# Patient Record
Sex: MALE | Race: WHITE | NOT HISPANIC OR LATINO | URBAN - METROPOLITAN AREA
[De-identification: names, ages, dates, MRNs, and addresses within clinical notes are randomized per-mention and may not be internally consistent; named-entity substitution may affect disease eponyms.]

---

## 2023-04-17 ENCOUNTER — INPATIENT (INPATIENT)
Facility: HOSPITAL | Age: 53
LOS: 1 days | Discharge: HOME | DRG: 354 | End: 2023-04-19
Attending: SPECIALIST | Admitting: SPECIALIST
Payer: MEDICARE

## 2023-04-17 VITALS
DIASTOLIC BLOOD PRESSURE: 85 MMHG | SYSTOLIC BLOOD PRESSURE: 125 MMHG | RESPIRATION RATE: 20 BRPM | HEART RATE: 101 BPM | TEMPERATURE: 99 F | OXYGEN SATURATION: 93 % | HEIGHT: 75 IN | WEIGHT: 315 LBS

## 2023-04-17 DIAGNOSIS — K56.609 UNSPECIFIED INTESTINAL OBSTRUCTION, UNSPECIFIED AS TO PARTIAL VERSUS COMPLETE OBSTRUCTION: ICD-10-CM

## 2023-04-17 LAB
ALBUMIN SERPL ELPH-MCNC: 4.8 G/DL — SIGNIFICANT CHANGE UP (ref 3.3–5.2)
ALP SERPL-CCNC: 54 U/L — SIGNIFICANT CHANGE UP (ref 40–120)
ALT FLD-CCNC: 25 U/L — SIGNIFICANT CHANGE UP
ANION GAP SERPL CALC-SCNC: 19 MMOL/L — HIGH (ref 5–17)
APTT BLD: 26.3 SEC — LOW (ref 27.5–35.5)
AST SERPL-CCNC: 30 U/L — SIGNIFICANT CHANGE UP
BASE EXCESS BLDV CALC-SCNC: 9.8 MMOL/L — HIGH (ref -2–3)
BASOPHILS # BLD AUTO: 0 K/UL — SIGNIFICANT CHANGE UP (ref 0–0.2)
BASOPHILS NFR BLD AUTO: 0 % — SIGNIFICANT CHANGE UP (ref 0–2)
BILIRUB SERPL-MCNC: 0.5 MG/DL — SIGNIFICANT CHANGE UP (ref 0.4–2)
BLD GP AB SCN SERPL QL: SIGNIFICANT CHANGE UP
BUN SERPL-MCNC: 48.4 MG/DL — HIGH (ref 8–20)
CA-I SERPL-SCNC: 1.09 MMOL/L — LOW (ref 1.15–1.33)
CALCIUM SERPL-MCNC: 10 MG/DL — SIGNIFICANT CHANGE UP (ref 8.4–10.5)
CHLORIDE BLDV-SCNC: 93 MMOL/L — LOW (ref 96–108)
CHLORIDE SERPL-SCNC: 89 MMOL/L — LOW (ref 96–108)
CO2 SERPL-SCNC: 25 MMOL/L — SIGNIFICANT CHANGE UP (ref 22–29)
CREAT SERPL-MCNC: 1.62 MG/DL — HIGH (ref 0.5–1.3)
EGFR: 50 ML/MIN/1.73M2 — LOW
EOSINOPHIL # BLD AUTO: 0.34 K/UL — SIGNIFICANT CHANGE UP (ref 0–0.5)
EOSINOPHIL NFR BLD AUTO: 5.2 % — SIGNIFICANT CHANGE UP (ref 0–6)
FLUAV AG NPH QL: SIGNIFICANT CHANGE UP
FLUBV AG NPH QL: SIGNIFICANT CHANGE UP
GAS PNL BLDV: 132 MMOL/L — LOW (ref 136–145)
GAS PNL BLDV: SIGNIFICANT CHANGE UP
GIANT PLATELETS BLD QL SMEAR: PRESENT — SIGNIFICANT CHANGE UP
GLUCOSE BLDV-MCNC: 159 MG/DL — HIGH (ref 70–99)
GLUCOSE SERPL-MCNC: 147 MG/DL — HIGH (ref 70–99)
HCO3 BLDV-SCNC: 33 MMOL/L — HIGH (ref 22–29)
HCT VFR BLD CALC: 41.8 % — SIGNIFICANT CHANGE UP (ref 39–50)
HCT VFR BLDA CALC: 44 % — SIGNIFICANT CHANGE UP
HGB BLD CALC-MCNC: 14.6 G/DL — SIGNIFICANT CHANGE UP (ref 12.6–17.4)
HGB BLD-MCNC: 13.9 G/DL — SIGNIFICANT CHANGE UP (ref 13–17)
INR BLD: 1.2 RATIO — HIGH (ref 0.88–1.16)
LACTATE BLDV-MCNC: 1.7 MMOL/L — SIGNIFICANT CHANGE UP (ref 0.5–2)
LIDOCAIN IGE QN: 127 U/L — HIGH (ref 22–51)
LYMPHOCYTES # BLD AUTO: 1.67 K/UL — SIGNIFICANT CHANGE UP (ref 1–3.3)
LYMPHOCYTES # BLD AUTO: 25.2 % — SIGNIFICANT CHANGE UP (ref 13–44)
MANUAL SMEAR VERIFICATION: SIGNIFICANT CHANGE UP
MCHC RBC-ENTMCNC: 27.6 PG — SIGNIFICANT CHANGE UP (ref 27–34)
MCHC RBC-ENTMCNC: 33.3 GM/DL — SIGNIFICANT CHANGE UP (ref 32–36)
MCV RBC AUTO: 83.1 FL — SIGNIFICANT CHANGE UP (ref 80–100)
MONOCYTES # BLD AUTO: 1.09 K/UL — HIGH (ref 0–0.9)
MONOCYTES NFR BLD AUTO: 16.5 % — HIGH (ref 2–14)
NEUTROPHILS # BLD AUTO: 3.35 K/UL — SIGNIFICANT CHANGE UP (ref 1.8–7.4)
NEUTROPHILS NFR BLD AUTO: 50.5 % — SIGNIFICANT CHANGE UP (ref 43–77)
PCO2 BLDV: 40 MMHG — LOW (ref 42–55)
PH BLDV: 7.52 — HIGH (ref 7.32–7.43)
PLAT MORPH BLD: NORMAL — SIGNIFICANT CHANGE UP
PLATELET # BLD AUTO: 439 K/UL — HIGH (ref 150–400)
PO2 BLDV: 177 MMHG — HIGH (ref 25–45)
POTASSIUM BLDV-SCNC: 3.9 MMOL/L — SIGNIFICANT CHANGE UP (ref 3.5–5.1)
POTASSIUM SERPL-MCNC: 3.9 MMOL/L — SIGNIFICANT CHANGE UP (ref 3.5–5.3)
POTASSIUM SERPL-SCNC: 3.9 MMOL/L — SIGNIFICANT CHANGE UP (ref 3.5–5.3)
PROT SERPL-MCNC: 9.1 G/DL — HIGH (ref 6.6–8.7)
PROTHROM AB SERPL-ACNC: 13.9 SEC — HIGH (ref 10.5–13.4)
RBC # BLD: 5.03 M/UL — SIGNIFICANT CHANGE UP (ref 4.2–5.8)
RBC # FLD: 14.6 % — HIGH (ref 10.3–14.5)
RBC BLD AUTO: NORMAL — SIGNIFICANT CHANGE UP
RSV RNA NPH QL NAA+NON-PROBE: SIGNIFICANT CHANGE UP
SAO2 % BLDV: 99.8 % — SIGNIFICANT CHANGE UP
SARS-COV-2 RNA SPEC QL NAA+PROBE: SIGNIFICANT CHANGE UP
SODIUM SERPL-SCNC: 133 MMOL/L — LOW (ref 135–145)
VARIANT LYMPHS # BLD: 2.6 % — SIGNIFICANT CHANGE UP (ref 0–6)
WBC # BLD: 6.63 K/UL — SIGNIFICANT CHANGE UP (ref 3.8–10.5)
WBC # FLD AUTO: 6.63 K/UL — SIGNIFICANT CHANGE UP (ref 3.8–10.5)

## 2023-04-17 PROCEDURE — 74176 CT ABD & PELVIS W/O CONTRAST: CPT | Mod: 26,59

## 2023-04-17 PROCEDURE — 99285 EMERGENCY DEPT VISIT HI MDM: CPT | Mod: FS

## 2023-04-17 PROCEDURE — 74177 CT ABD & PELVIS W/CONTRAST: CPT | Mod: 26,MA

## 2023-04-17 PROCEDURE — 99221 1ST HOSP IP/OBS SF/LOW 40: CPT | Mod: GC

## 2023-04-17 RX ORDER — ONDANSETRON 8 MG/1
4 TABLET, FILM COATED ORAL EVERY 6 HOURS
Refills: 0 | Status: DISCONTINUED | OUTPATIENT
Start: 2023-04-17 | End: 2023-04-18

## 2023-04-17 RX ORDER — MORPHINE SULFATE 50 MG/1
2 CAPSULE, EXTENDED RELEASE ORAL ONCE
Refills: 0 | Status: DISCONTINUED | OUTPATIENT
Start: 2023-04-17 | End: 2023-04-17

## 2023-04-17 RX ORDER — MORPHINE SULFATE 50 MG/1
2 CAPSULE, EXTENDED RELEASE ORAL EVERY 4 HOURS
Refills: 0 | Status: DISCONTINUED | OUTPATIENT
Start: 2023-04-17 | End: 2023-04-19

## 2023-04-17 RX ORDER — SODIUM CHLORIDE 9 MG/ML
1000 INJECTION, SOLUTION INTRAVENOUS
Refills: 0 | Status: DISCONTINUED | OUTPATIENT
Start: 2023-04-17 | End: 2023-04-18

## 2023-04-17 RX ORDER — MORPHINE SULFATE 50 MG/1
4 CAPSULE, EXTENDED RELEASE ORAL ONCE
Refills: 0 | Status: DISCONTINUED | OUTPATIENT
Start: 2023-04-17 | End: 2023-04-17

## 2023-04-17 RX ORDER — KETOROLAC TROMETHAMINE 30 MG/ML
15 SYRINGE (ML) INJECTION EVERY 6 HOURS
Refills: 0 | Status: DISCONTINUED | OUTPATIENT
Start: 2023-04-17 | End: 2023-04-18

## 2023-04-17 RX ORDER — ACETAMINOPHEN 500 MG
1000 TABLET ORAL EVERY 6 HOURS
Refills: 0 | Status: COMPLETED | OUTPATIENT
Start: 2023-04-17 | End: 2023-04-18

## 2023-04-17 RX ORDER — ONDANSETRON 8 MG/1
4 TABLET, FILM COATED ORAL ONCE
Refills: 0 | Status: COMPLETED | OUTPATIENT
Start: 2023-04-17 | End: 2023-04-17

## 2023-04-17 RX ORDER — SODIUM CHLORIDE 9 MG/ML
1000 INJECTION INTRAMUSCULAR; INTRAVENOUS; SUBCUTANEOUS ONCE
Refills: 0 | Status: COMPLETED | OUTPATIENT
Start: 2023-04-17 | End: 2023-04-17

## 2023-04-17 RX ORDER — HEPARIN SODIUM 5000 [USP'U]/ML
5000 INJECTION INTRAVENOUS; SUBCUTANEOUS EVERY 8 HOURS
Refills: 0 | Status: DISCONTINUED | OUTPATIENT
Start: 2023-04-17 | End: 2023-04-18

## 2023-04-17 RX ORDER — HEPARIN SODIUM 5000 [USP'U]/ML
7500 INJECTION INTRAVENOUS; SUBCUTANEOUS EVERY 8 HOURS
Refills: 0 | Status: DISCONTINUED | OUTPATIENT
Start: 2023-04-17 | End: 2023-04-17

## 2023-04-17 RX ORDER — METOCLOPRAMIDE HCL 10 MG
10 TABLET ORAL ONCE
Refills: 0 | Status: COMPLETED | OUTPATIENT
Start: 2023-04-17 | End: 2023-04-17

## 2023-04-17 RX ORDER — METOCLOPRAMIDE HCL 10 MG
5 TABLET ORAL ONCE
Refills: 0 | Status: COMPLETED | OUTPATIENT
Start: 2023-04-17 | End: 2023-04-18

## 2023-04-17 RX ADMIN — SODIUM CHLORIDE 1000 MILLILITER(S): 9 INJECTION INTRAMUSCULAR; INTRAVENOUS; SUBCUTANEOUS at 10:10

## 2023-04-17 RX ADMIN — MORPHINE SULFATE 4 MILLIGRAM(S): 50 CAPSULE, EXTENDED RELEASE ORAL at 12:57

## 2023-04-17 RX ADMIN — MORPHINE SULFATE 4 MILLIGRAM(S): 50 CAPSULE, EXTENDED RELEASE ORAL at 14:01

## 2023-04-17 RX ADMIN — Medication 10 MILLIGRAM(S): at 20:08

## 2023-04-17 RX ADMIN — MORPHINE SULFATE 2 MILLIGRAM(S): 50 CAPSULE, EXTENDED RELEASE ORAL at 16:44

## 2023-04-17 RX ADMIN — HEPARIN SODIUM 7500 UNIT(S): 5000 INJECTION INTRAVENOUS; SUBCUTANEOUS at 20:53

## 2023-04-17 RX ADMIN — ONDANSETRON 4 MILLIGRAM(S): 8 TABLET, FILM COATED ORAL at 12:57

## 2023-04-17 RX ADMIN — SODIUM CHLORIDE 1000 MILLILITER(S): 9 INJECTION INTRAMUSCULAR; INTRAVENOUS; SUBCUTANEOUS at 14:01

## 2023-04-17 RX ADMIN — Medication 400 MILLIGRAM(S): at 18:03

## 2023-04-17 RX ADMIN — Medication 400 MILLIGRAM(S): at 23:46

## 2023-04-17 RX ADMIN — MORPHINE SULFATE 4 MILLIGRAM(S): 50 CAPSULE, EXTENDED RELEASE ORAL at 10:09

## 2023-04-17 RX ADMIN — ONDANSETRON 4 MILLIGRAM(S): 8 TABLET, FILM COATED ORAL at 10:10

## 2023-04-17 RX ADMIN — MORPHINE SULFATE 2 MILLIGRAM(S): 50 CAPSULE, EXTENDED RELEASE ORAL at 18:14

## 2023-04-17 RX ADMIN — MORPHINE SULFATE 2 MILLIGRAM(S): 50 CAPSULE, EXTENDED RELEASE ORAL at 20:55

## 2023-04-17 RX ADMIN — ONDANSETRON 4 MILLIGRAM(S): 8 TABLET, FILM COATED ORAL at 16:48

## 2023-04-17 RX ADMIN — SODIUM CHLORIDE 100 MILLILITER(S): 9 INJECTION, SOLUTION INTRAVENOUS at 14:03

## 2023-04-17 NOTE — ED STATDOCS - OBJECTIVE STATEMENT
51 y/o male with no significant PMHx presents to ED c/o abdominal pain since Friday with associated n/v, hematuria and diaphoresis. Pt vomited black/dark brown color on Saturday. Then, pt reports watery stool. Pt lost about 30 pounds over the weekend. Pain came back last night, to the left lower quadrant and pain to umbilicus. Diarrhea has subsided, but pt still feels off. No allergies to medications or daily medication use. Denies f/cp/sob/palpitations/ cough/rash/headache/dizziness/abd.pain/d/c/dysuria 51 y/o male with no significant PMHx presents to ED c/o abdominal pain since Friday with associated n/v/d. Pt vomited black/dark brown color on Saturday. Then, pt reports watery stool. Pt lost about 30 pounds over the weekend?. Pain came back last night, to the left lower quadrant and pain to umbilicus. Diarrhea has subsided, but pt still feels off. No allergies to medications or daily medication use. Denies f/cp/sob/palpitations/ cough/rash/headache/dizziness/c/dysuria

## 2023-04-17 NOTE — ED STATDOCS - CLINICAL SUMMARY MEDICAL DECISION MAKING FREE TEXT BOX
53 y/o male with no significant PMHx presents to ED c/o abdominal pain since past Friday with associated n/v, and diaphoresis. Labs, CT r/o diverticulitis, reassess. 53 y/o male with no significant PMHx presents to ED c/o abdominal pain since past Friday with associated n/v, and diaphoresis.    will check Labs, CT r/o diverticulitis vs hernia, reassess. 51 y/o male with no significant PMHx presents to ED c/o abdominal pain since past Friday with associated n/v, and diaphoresis.    will check Labs, CT r/o diverticulitis vs hernia, reassess.    PIPER Melgar @ 13:40-- Pts labs reviewed. CT scan shows small bowel obstruction/hernia. Surgery consulted -- admitted to Dr. Kirk.

## 2023-04-17 NOTE — ED STATDOCS - NS ED ATTENDING STATEMENT MOD
This was a shared visit with the AUGUSTA. I reviewed and verified the documentation and independently performed the documented:

## 2023-04-17 NOTE — ED STATDOCS - ATTENDING APP SHARED VISIT CONTRIBUTION OF CARE
I, Gilda Rodríguez, performed the initial face to face bedside interview with this patient regarding history of present illness, review of symptoms and relevant past medical, social and family history.  I completed an independent physical examination.  I was the initial provider who evaluated this patient. I have signed out the follow up of any pending tests (i.e. labs, radiological studies) to the ACP.  I have communicated the patient’s plan of care and disposition with the ACP.

## 2023-04-17 NOTE — H&P ADULT - ATTENDING COMMENTS
Pt with SBO from incarcerated umbilical hernia with significant volume loss from vomiting and diarrhea. Will need volume resuscitation and repeat CT with contrast. Abdominal exam reveals no tenderness and WBC normal.

## 2023-04-17 NOTE — ED STATDOCS - PROGRESS NOTE DETAILS
Pt resting comfortably, VSS in NAD at this time. Pts CT scan shows small bowel obstruction secondary to umbilical hernia, possibly strangulated. Surgery consulted and will evaluate pt shortly.

## 2023-04-17 NOTE — H&P ADULT - ASSESSMENT
51 yo M presenting with SBO secondary to incarcerated umbilcial hernia without signs of bowel compromise.   Hernia was reduced at bedside without complications, however, patient  and at high risk of reincarceration.    Admit to surgery  Will obtain CT with PO contrast only to assess if hernia is really obstructed after reduction  NPO/IVF, noted to have MARIUM without prior history of renal disease  DVT ppx  OR tomorrow for umbilical hernia repair  Monitor bowel function  JUAN JOSÉ

## 2023-04-17 NOTE — ED ADULT TRIAGE NOTE - CHIEF COMPLAINT QUOTE
pt c/o mid and left side abdominal pain, started a few weeks ago, went away, then Friday had very bad Left lower abd pain, vomited, had diarrhea  A&Ox3, resp wnl,

## 2023-04-17 NOTE — H&P ADULT - NSHPPHYSICALEXAM_GEN_ALL_CORE
PHYSICAL EXAM:  GENERAL: NAD, well-groomed, well-developed  HEAD:  Atraumatic, Normocephalic  EYES: EOMI, PERRLA, conjunctiva and sclera clear  NECK: Supple, No JVD  CHEST/LUNG: non-labored breathing on room air.   HEART: Regular rate and rhythm; S1/S2  ABDOMEN: Soft, non-distended, mild periumbilical tenderness, no rebound, fuarding or rigidity. 3cm umbilical hernia without skin changes, reducible at bedside.   VASCULAR: Normal pulses, Normal capillary refill  EXTREMITIES:  2+ Peripheral Pulses, No cyanosis, No edema  SKIN: Warm, Intact

## 2023-04-17 NOTE — ED STATDOCS - PHYSICAL EXAMINATION
Head: atraumatic, normacephalic  Face: atraumatic, no crepitus no orbiral/maxillary/mandibular ttp  throat: uvula midline no exudates  eyes: perrla eomi  heart: rrr s1s2  lungs: ctab  abd: soft, Small reducible periumbilical hernia LLQ tenderness. nd +bs no rebound/guarding no cva ttp  skin: warm  LE: no swelling, no calf ttp  back: no midline cervical/thoracic/lumbar ttp

## 2023-04-17 NOTE — CHART NOTE - NSCHARTNOTEFT_GEN_A_CORE
patient seen and examined at bedside. CT scan results noted. dilation of bowel consistent with bowel obstruction. Despite multiple attempts at NGT placement, patient refused further attempts tonight. explained risks and benefits of placment including risk of avoidance with possible aspiration, emesis, pneumonia, respiratory episode, which patient understood.     On exam abdomen is soft and obese, tender around hernia site. non peritonitic

## 2023-04-17 NOTE — H&P ADULT - HISTORY OF PRESENT ILLNESS
51 yo M wioth no PMH and no past abdominal surgical histor presents to the ED complaining of periumbilical abdominal pain that radiates to LLQ since friday. Patient reports that this is the first time he has a pain like this and started having nausea, vomiting, and diarreha on Saturday. Since then he has not been able to have BM or pass flatus and continues with PO intolerance.   Denies fevers, chills, nausea, emesis.  Denies chest pain, dyspnea.   Denies headaches, dizziness, changes in vision.

## 2023-04-18 ENCOUNTER — TRANSCRIPTION ENCOUNTER (OUTPATIENT)
Age: 53
End: 2023-04-18

## 2023-04-18 LAB
ANION GAP SERPL CALC-SCNC: 17 MMOL/L — SIGNIFICANT CHANGE UP (ref 5–17)
APTT BLD: 25.9 SEC — LOW (ref 27.5–35.5)
BASOPHILS # BLD AUTO: 0 K/UL — SIGNIFICANT CHANGE UP (ref 0–0.2)
BASOPHILS NFR BLD AUTO: 0 % — SIGNIFICANT CHANGE UP (ref 0–2)
BUN SERPL-MCNC: 50.8 MG/DL — HIGH (ref 8–20)
CALCIUM SERPL-MCNC: 9.8 MG/DL — SIGNIFICANT CHANGE UP (ref 8.4–10.5)
CHLORIDE SERPL-SCNC: 89 MMOL/L — LOW (ref 96–108)
CO2 SERPL-SCNC: 32 MMOL/L — HIGH (ref 22–29)
CREAT SERPL-MCNC: 1.47 MG/DL — HIGH (ref 0.5–1.3)
EGFR: 57 ML/MIN/1.73M2 — LOW
EOSINOPHIL # BLD AUTO: 0.12 K/UL — SIGNIFICANT CHANGE UP (ref 0–0.5)
EOSINOPHIL NFR BLD AUTO: 1.8 % — SIGNIFICANT CHANGE UP (ref 0–6)
GIANT PLATELETS BLD QL SMEAR: PRESENT — SIGNIFICANT CHANGE UP
GLUCOSE BLDC GLUCOMTR-MCNC: 124 MG/DL — HIGH (ref 70–99)
GLUCOSE SERPL-MCNC: 127 MG/DL — HIGH (ref 70–99)
HCT VFR BLD CALC: 40.5 % — SIGNIFICANT CHANGE UP (ref 39–50)
HGB BLD-MCNC: 13.2 G/DL — SIGNIFICANT CHANGE UP (ref 13–17)
INR BLD: 1.1 RATIO — SIGNIFICANT CHANGE UP (ref 0.88–1.16)
LACTATE BLDV-MCNC: 1.8 MMOL/L — SIGNIFICANT CHANGE UP (ref 0.5–2)
LYMPHOCYTES # BLD AUTO: 1.23 K/UL — SIGNIFICANT CHANGE UP (ref 1–3.3)
LYMPHOCYTES # BLD AUTO: 18.6 % — SIGNIFICANT CHANGE UP (ref 13–44)
MAGNESIUM SERPL-MCNC: 2.5 MG/DL — SIGNIFICANT CHANGE UP (ref 1.8–2.6)
MANUAL SMEAR VERIFICATION: SIGNIFICANT CHANGE UP
MCHC RBC-ENTMCNC: 27.6 PG — SIGNIFICANT CHANGE UP (ref 27–34)
MCHC RBC-ENTMCNC: 32.6 GM/DL — SIGNIFICANT CHANGE UP (ref 32–36)
MCV RBC AUTO: 84.6 FL — SIGNIFICANT CHANGE UP (ref 80–100)
MONOCYTES # BLD AUTO: 1.23 K/UL — HIGH (ref 0–0.9)
MONOCYTES NFR BLD AUTO: 18.6 % — HIGH (ref 2–14)
MYELOCYTES NFR BLD: 0.9 % — HIGH (ref 0–0)
NEUTROPHILS # BLD AUTO: 3.75 K/UL — SIGNIFICANT CHANGE UP (ref 1.8–7.4)
NEUTROPHILS NFR BLD AUTO: 56.6 % — SIGNIFICANT CHANGE UP (ref 43–77)
PHOSPHATE SERPL-MCNC: 5.5 MG/DL — HIGH (ref 2.4–4.7)
PLAT MORPH BLD: NORMAL — SIGNIFICANT CHANGE UP
PLATELET # BLD AUTO: 413 K/UL — HIGH (ref 150–400)
POTASSIUM SERPL-MCNC: 3.9 MMOL/L — SIGNIFICANT CHANGE UP (ref 3.5–5.3)
POTASSIUM SERPL-SCNC: 3.9 MMOL/L — SIGNIFICANT CHANGE UP (ref 3.5–5.3)
PROTHROM AB SERPL-ACNC: 12.8 SEC — SIGNIFICANT CHANGE UP (ref 10.5–13.4)
RBC # BLD: 4.79 M/UL — SIGNIFICANT CHANGE UP (ref 4.2–5.8)
RBC # FLD: 14.6 % — HIGH (ref 10.3–14.5)
RBC BLD AUTO: NORMAL — SIGNIFICANT CHANGE UP
SODIUM SERPL-SCNC: 138 MMOL/L — SIGNIFICANT CHANGE UP (ref 135–145)
VARIANT LYMPHS # BLD: 3.5 % — SIGNIFICANT CHANGE UP (ref 0–6)
WBC # BLD: 6.62 K/UL — SIGNIFICANT CHANGE UP (ref 3.8–10.5)
WBC # FLD AUTO: 6.62 K/UL — SIGNIFICANT CHANGE UP (ref 3.8–10.5)

## 2023-04-18 PROCEDURE — 49592 RPR AA HRN 1ST < 3 NCR/STRN: CPT

## 2023-04-18 DEVICE — MESH VENTRAL PATCH 6.6 CM: Type: IMPLANTABLE DEVICE | Status: FUNCTIONAL

## 2023-04-18 RX ORDER — SODIUM CHLORIDE 9 MG/ML
1000 INJECTION, SOLUTION INTRAVENOUS
Refills: 0 | Status: DISCONTINUED | OUTPATIENT
Start: 2023-04-18 | End: 2023-04-18

## 2023-04-18 RX ORDER — LIDOCAINE 4 G/100G
1 CREAM TOPICAL ONCE
Refills: 0 | Status: COMPLETED | OUTPATIENT
Start: 2023-04-18 | End: 2023-04-18

## 2023-04-18 RX ORDER — HEPARIN SODIUM 5000 [USP'U]/ML
5000 INJECTION INTRAVENOUS; SUBCUTANEOUS EVERY 8 HOURS
Refills: 0 | Status: DISCONTINUED | OUTPATIENT
Start: 2023-04-18 | End: 2023-04-19

## 2023-04-18 RX ORDER — ACETAMINOPHEN 500 MG
975 TABLET ORAL EVERY 8 HOURS
Refills: 0 | Status: DISCONTINUED | OUTPATIENT
Start: 2023-04-18 | End: 2023-04-19

## 2023-04-18 RX ORDER — SODIUM CHLORIDE 9 MG/ML
1000 INJECTION, SOLUTION INTRAVENOUS
Refills: 0 | Status: DISCONTINUED | OUTPATIENT
Start: 2023-04-18 | End: 2023-04-19

## 2023-04-18 RX ORDER — OXYMETAZOLINE HYDROCHLORIDE 0.5 MG/ML
1 SPRAY NASAL ONCE
Refills: 0 | Status: COMPLETED | OUTPATIENT
Start: 2023-04-18 | End: 2023-04-18

## 2023-04-18 RX ORDER — ONDANSETRON 8 MG/1
4 TABLET, FILM COATED ORAL EVERY 6 HOURS
Refills: 0 | Status: DISCONTINUED | OUTPATIENT
Start: 2023-04-18 | End: 2023-04-19

## 2023-04-18 RX ADMIN — HEPARIN SODIUM 5000 UNIT(S): 5000 INJECTION INTRAVENOUS; SUBCUTANEOUS at 13:37

## 2023-04-18 RX ADMIN — Medication 975 MILLIGRAM(S): at 22:25

## 2023-04-18 RX ADMIN — Medication 5 MILLIGRAM(S): at 02:31

## 2023-04-18 RX ADMIN — Medication 975 MILLIGRAM(S): at 21:25

## 2023-04-18 RX ADMIN — Medication 400 MILLIGRAM(S): at 11:29

## 2023-04-18 RX ADMIN — HEPARIN SODIUM 5000 UNIT(S): 5000 INJECTION INTRAVENOUS; SUBCUTANEOUS at 06:33

## 2023-04-18 RX ADMIN — LIDOCAINE 1 APPLICATION(S): 4 CREAM TOPICAL at 12:47

## 2023-04-18 RX ADMIN — ONDANSETRON 4 MILLIGRAM(S): 8 TABLET, FILM COATED ORAL at 13:37

## 2023-04-18 RX ADMIN — Medication 1000 MILLIGRAM(S): at 12:36

## 2023-04-18 RX ADMIN — Medication 15 MILLIGRAM(S): at 02:17

## 2023-04-18 RX ADMIN — ONDANSETRON 4 MILLIGRAM(S): 8 TABLET, FILM COATED ORAL at 00:14

## 2023-04-18 RX ADMIN — ONDANSETRON 4 MILLIGRAM(S): 8 TABLET, FILM COATED ORAL at 06:34

## 2023-04-18 RX ADMIN — SODIUM CHLORIDE 125 MILLILITER(S): 9 INJECTION, SOLUTION INTRAVENOUS at 00:16

## 2023-04-18 RX ADMIN — Medication 15 MILLIGRAM(S): at 11:02

## 2023-04-18 RX ADMIN — SODIUM CHLORIDE 250 MILLILITER(S): 9 INJECTION, SOLUTION INTRAVENOUS at 12:35

## 2023-04-18 RX ADMIN — HEPARIN SODIUM 5000 UNIT(S): 5000 INJECTION INTRAVENOUS; SUBCUTANEOUS at 22:15

## 2023-04-18 RX ADMIN — Medication 15 MILLIGRAM(S): at 02:32

## 2023-04-18 RX ADMIN — Medication 15 MILLIGRAM(S): at 10:32

## 2023-04-18 RX ADMIN — Medication 1000 MILLIGRAM(S): at 08:26

## 2023-04-18 RX ADMIN — OXYMETAZOLINE HYDROCHLORIDE 1 SPRAY(S): 0.5 SPRAY NASAL at 12:47

## 2023-04-18 NOTE — CHART NOTE - NSCHARTNOTEFT_GEN_A_CORE
Post op Check    Patient status post umbilical hernia repair. States pain is well controlled. Tolerating diet without issues and in good spirits. He states he had voided and has had one bowel movement since his surgery. Denies n.v/cp/sob.     MEDICATIONS  (STANDING):  acetaminophen     Tablet .. 975 milliGRAM(s) Oral every 8 hours  heparin   Injectable 5000 Unit(s) SubCutaneous every 8 hours  lactated ringers. 1000 milliLiter(s) (125 mL/Hr) IV Continuous <Continuous>    MEDICATIONS  (PRN):  morphine  - Injectable 2 milliGRAM(s) IV Push every 4 hours PRN Severe Pain (7 - 10)  ondansetron    Tablet 4 milliGRAM(s) Oral every 6 hours PRN Nausea and/or Vomiting      Vital Signs Last 24 Hrs  T(C): 36.4 (18 Apr 2023 18:57), Max: 37 (18 Apr 2023 00:41)  T(F): 97.6 (18 Apr 2023 18:57), Max: 98.6 (18 Apr 2023 00:41)  HR: 85 (18 Apr 2023 18:57) (80 - 102)  BP: 139/80 (18 Apr 2023 18:57) (119/77 - 152/80)  BP(mean): 96 (18 Apr 2023 18:30) (91 - 96)  RR: 18 (18 Apr 2023 18:57) (13 - 18)  SpO2: 92% (18 Apr 2023 18:57) (91% - 100%)    Pe  General: resting comfortably in bed NAD.   Abdomen: periumbilical incision c/d/i. Abdominal distention unchanged form this AM. mildly tender.       MEDICATIONS  (STANDING):  acetaminophen     Tablet .. 975 milliGRAM(s) Oral every 8 hours  heparin   Injectable 5000 Unit(s) SubCutaneous every 8 hours  lactated ringers. 1000 milliLiter(s) (125 mL/Hr) IV Continuous <Continuous>    MEDICATIONS  (PRN):  morphine  - Injectable 2 milliGRAM(s) IV Push every 4 hours PRN Severe Pain (7 - 10)  ondansetron    Tablet 4 milliGRAM(s) Oral every 6 hours PRN Nausea and/or Vomiting      plan  - continue diet  - moniter diet tolerance and pain  - Likely d/c in AM  - continue IV fluids given Juliette

## 2023-04-18 NOTE — BRIEF OPERATIVE NOTE - OPERATION/FINDINGS
Procedure: Umbilical hernia repair with 6x6cm Parietex mesh.    Pre-peritoneal fat in the hernia sac, reduced.   Bowel seen and viable.  6x6 pre-peritoneal mesh placed and sutured in 4 corners with prolene.  Defect closed with simple 0 prolene sutures.

## 2023-04-19 ENCOUNTER — TRANSCRIPTION ENCOUNTER (OUTPATIENT)
Age: 53
End: 2023-04-19

## 2023-04-19 VITALS
SYSTOLIC BLOOD PRESSURE: 127 MMHG | DIASTOLIC BLOOD PRESSURE: 76 MMHG | TEMPERATURE: 98 F | HEART RATE: 79 BPM | OXYGEN SATURATION: 95 % | RESPIRATION RATE: 18 BRPM

## 2023-04-19 LAB
ANION GAP SERPL CALC-SCNC: 10 MMOL/L — SIGNIFICANT CHANGE UP (ref 5–17)
BUN SERPL-MCNC: 24.6 MG/DL — HIGH (ref 8–20)
CALCIUM SERPL-MCNC: 8.1 MG/DL — LOW (ref 8.4–10.5)
CHLORIDE SERPL-SCNC: 99 MMOL/L — SIGNIFICANT CHANGE UP (ref 96–108)
CO2 SERPL-SCNC: 30 MMOL/L — HIGH (ref 22–29)
CREAT SERPL-MCNC: 0.86 MG/DL — SIGNIFICANT CHANGE UP (ref 0.5–1.3)
EGFR: 104 ML/MIN/1.73M2 — SIGNIFICANT CHANGE UP
GLUCOSE SERPL-MCNC: 123 MG/DL — HIGH (ref 70–99)
HCT VFR BLD CALC: 34.9 % — LOW (ref 39–50)
HGB BLD-MCNC: 10.9 G/DL — LOW (ref 13–17)
MAGNESIUM SERPL-MCNC: 2.5 MG/DL — SIGNIFICANT CHANGE UP (ref 1.6–2.6)
MCHC RBC-ENTMCNC: 27.3 PG — SIGNIFICANT CHANGE UP (ref 27–34)
MCHC RBC-ENTMCNC: 31.2 GM/DL — LOW (ref 32–36)
MCV RBC AUTO: 87.3 FL — SIGNIFICANT CHANGE UP (ref 80–100)
PHOSPHATE SERPL-MCNC: 2.2 MG/DL — LOW (ref 2.4–4.7)
PLATELET # BLD AUTO: 292 K/UL — SIGNIFICANT CHANGE UP (ref 150–400)
POTASSIUM SERPL-MCNC: 4.7 MMOL/L — SIGNIFICANT CHANGE UP (ref 3.5–5.3)
POTASSIUM SERPL-SCNC: 4.7 MMOL/L — SIGNIFICANT CHANGE UP (ref 3.5–5.3)
RBC # BLD: 4 M/UL — LOW (ref 4.2–5.8)
RBC # FLD: 14.3 % — SIGNIFICANT CHANGE UP (ref 10.3–14.5)
SODIUM SERPL-SCNC: 139 MMOL/L — SIGNIFICANT CHANGE UP (ref 135–145)
WBC # BLD: 6.62 K/UL — SIGNIFICANT CHANGE UP (ref 3.8–10.5)
WBC # FLD AUTO: 6.62 K/UL — SIGNIFICANT CHANGE UP (ref 3.8–10.5)

## 2023-04-19 PROCEDURE — 83735 ASSAY OF MAGNESIUM: CPT

## 2023-04-19 PROCEDURE — 74176 CT ABD & PELVIS W/O CONTRAST: CPT

## 2023-04-19 PROCEDURE — 82947 ASSAY GLUCOSE BLOOD QUANT: CPT

## 2023-04-19 PROCEDURE — 82330 ASSAY OF CALCIUM: CPT

## 2023-04-19 PROCEDURE — C1781: CPT

## 2023-04-19 PROCEDURE — 85730 THROMBOPLASTIN TIME PARTIAL: CPT

## 2023-04-19 PROCEDURE — 85025 COMPLETE CBC W/AUTO DIFF WBC: CPT

## 2023-04-19 PROCEDURE — 84132 ASSAY OF SERUM POTASSIUM: CPT

## 2023-04-19 PROCEDURE — 96376 TX/PRO/DX INJ SAME DRUG ADON: CPT

## 2023-04-19 PROCEDURE — 99285 EMERGENCY DEPT VISIT HI MDM: CPT | Mod: 25

## 2023-04-19 PROCEDURE — 80053 COMPREHEN METABOLIC PANEL: CPT

## 2023-04-19 PROCEDURE — 96372 THER/PROPH/DIAG INJ SC/IM: CPT | Mod: XU

## 2023-04-19 PROCEDURE — 96374 THER/PROPH/DIAG INJ IV PUSH: CPT

## 2023-04-19 PROCEDURE — 84100 ASSAY OF PHOSPHORUS: CPT

## 2023-04-19 PROCEDURE — 86900 BLOOD TYPING SEROLOGIC ABO: CPT

## 2023-04-19 PROCEDURE — 85018 HEMOGLOBIN: CPT

## 2023-04-19 PROCEDURE — 87637 SARSCOV2&INF A&B&RSV AMP PRB: CPT

## 2023-04-19 PROCEDURE — 84295 ASSAY OF SERUM SODIUM: CPT

## 2023-04-19 PROCEDURE — 85014 HEMATOCRIT: CPT

## 2023-04-19 PROCEDURE — 83605 ASSAY OF LACTIC ACID: CPT

## 2023-04-19 PROCEDURE — 85027 COMPLETE CBC AUTOMATED: CPT

## 2023-04-19 PROCEDURE — 96375 TX/PRO/DX INJ NEW DRUG ADDON: CPT

## 2023-04-19 PROCEDURE — 74177 CT ABD & PELVIS W/CONTRAST: CPT | Mod: MA

## 2023-04-19 PROCEDURE — 86850 RBC ANTIBODY SCREEN: CPT

## 2023-04-19 PROCEDURE — 85610 PROTHROMBIN TIME: CPT

## 2023-04-19 PROCEDURE — 86901 BLOOD TYPING SEROLOGIC RH(D): CPT

## 2023-04-19 PROCEDURE — 82962 GLUCOSE BLOOD TEST: CPT

## 2023-04-19 PROCEDURE — 83690 ASSAY OF LIPASE: CPT

## 2023-04-19 PROCEDURE — 80048 BASIC METABOLIC PNL TOTAL CA: CPT

## 2023-04-19 PROCEDURE — 82435 ASSAY OF BLOOD CHLORIDE: CPT

## 2023-04-19 PROCEDURE — 82803 BLOOD GASES ANY COMBINATION: CPT

## 2023-04-19 PROCEDURE — 36415 COLL VENOUS BLD VENIPUNCTURE: CPT

## 2023-04-19 RX ORDER — METHOCARBAMOL 500 MG/1
750 TABLET, FILM COATED ORAL EVERY 6 HOURS
Refills: 0 | Status: DISCONTINUED | OUTPATIENT
Start: 2023-04-19 | End: 2023-04-19

## 2023-04-19 RX ORDER — ACETAMINOPHEN 500 MG
3 TABLET ORAL
Qty: 0 | Refills: 0 | DISCHARGE
Start: 2023-04-19

## 2023-04-19 RX ORDER — ONDANSETRON 8 MG/1
1 TABLET, FILM COATED ORAL
Qty: 20 | Refills: 0
Start: 2023-04-19 | End: 2023-04-23

## 2023-04-19 RX ADMIN — MORPHINE SULFATE 2 MILLIGRAM(S): 50 CAPSULE, EXTENDED RELEASE ORAL at 04:00

## 2023-04-19 RX ADMIN — Medication 975 MILLIGRAM(S): at 06:05

## 2023-04-19 RX ADMIN — METHOCARBAMOL 750 MILLIGRAM(S): 500 TABLET, FILM COATED ORAL at 06:05

## 2023-04-19 RX ADMIN — HEPARIN SODIUM 5000 UNIT(S): 5000 INJECTION INTRAVENOUS; SUBCUTANEOUS at 06:06

## 2023-04-19 RX ADMIN — SODIUM CHLORIDE 125 MILLILITER(S): 9 INJECTION, SOLUTION INTRAVENOUS at 06:32

## 2023-04-19 RX ADMIN — ONDANSETRON 4 MILLIGRAM(S): 8 TABLET, FILM COATED ORAL at 09:36

## 2023-04-19 RX ADMIN — MORPHINE SULFATE 2 MILLIGRAM(S): 50 CAPSULE, EXTENDED RELEASE ORAL at 03:45

## 2023-04-19 RX ADMIN — Medication 975 MILLIGRAM(S): at 06:54

## 2023-04-19 NOTE — DIETITIAN INITIAL EVALUATION ADULT - PERTINENT MEDS FT
MEDICATIONS  (STANDING):  acetaminophen     Tablet .. 975 milliGRAM(s) Oral every 8 hours  heparin   Injectable 5000 Unit(s) SubCutaneous every 8 hours  methocarbamol 750 milliGRAM(s) Oral every 6 hours    MEDICATIONS  (PRN):  morphine  - Injectable 2 milliGRAM(s) IV Push every 4 hours PRN Severe Pain (7 - 10)  ondansetron    Tablet 4 milliGRAM(s) Oral every 6 hours PRN Nausea and/or Vomiting

## 2023-04-19 NOTE — DIETITIAN INITIAL EVALUATION ADULT - OTHER INFO
51 yo M with no significant PMH presenting with SBO secondary to incarcerated umbilical hernia without signs of bowel compromise.  Patient also noted to have MARIUM; IV fluids started.  Continue to monitor BMP.

## 2023-04-19 NOTE — DISCHARGE NOTE PROVIDER - HOSPITAL COURSE
53 yo M wioth no PMH and no past abdominal surgical histor presents to the ED complaining of periumbilical abdominal pain that radiates to LLQ since friday. Patient reports that this is the first time he has a pain like this and started having nausea, vomiting, and diarreha on Saturday. Since then he has not been able to have BM or pass flatus and continues with PO intolerance.   Denies fevers, chills, nausea, emesis.  Denies chest pain, dyspnea.   Denies headaches, dizziness, changes in vision.       Patient was not able to have NGT placed in due to difficult nasal anatomy (bedside scope and attempt at insertion of NGT with scope). On HD12, patient was taken to the OR for a umbilical hernia repair w/ mesh. He was also treated with fluids for his MARIUM.    On d/c, he is tolerating a diet, having bowel function, pain well controlled, and ambulating at baseline.

## 2023-04-19 NOTE — DISCHARGE NOTE PROVIDER - CARE PROVIDER_API CALL
Kal Kirk)  Surgery  [address] [place] [address]  Phone: [phone]  Fax: [phone]  Follow Up Time: 2 weeks

## 2023-04-19 NOTE — PROGRESS NOTE ADULT - ASSESSMENT
51 yo M s/p umbilical hernia repair with mesh, tolerating diet, having bowel function and pain well controlled.  MARIUM on admission resolved with IVF    Regular diet  OOB and ambulate  Pain control, avoid narcotics  DC home today  Encourage PO intake

## 2023-04-19 NOTE — DIETITIAN INITIAL EVALUATION ADULT - PERTINENT LABORATORY DATA
04-19    139  |  99  |  24.6<H>  ----------------------------<  123<H>  4.7   |  30.0<H>  |  0.86    Ca    8.1<L>      19 Apr 2023 06:53  Phos  2.2     04-19  Mg     2.5     04-19    TPro  9.1<H>  /  Alb  4.8  /  TBili  0.5  /  DBili  x   /  AST  30  /  ALT  25  /  AlkPhos  54  04-17 04-19 Na139 mmol/L Glu 123 mg/dL<H> K+ 4.7 mmol/L Cr  0.86 mg/dL BUN 24.6 mg/dL<H> Phos 2.2 mg/dL<L> Alb n/a   PAB n/a

## 2023-04-19 NOTE — DISCHARGE NOTE PROVIDER - NSDCCPCAREPLAN_GEN_ALL_CORE_FT
PRINCIPAL DISCHARGE DIAGNOSIS  Diagnosis: Small bowel obstruction  Assessment and Plan of Treatment: Follow up: Please call and make an appointment withDr. Kirk 10-14 days after discharge. Also, please call and make an appointment with your primary care physician as per your usual schedule.   Activity: May return to normal activities as tolerated, however refrain from heavy lifting > 5 lbs.  Diet: May continue regular diet.  Medications: Please take all home medications as prescribed by your primary care doctor. Alternate between taking tylenol and motrin.  Wound Care: Please, keep wound site clean and dry. You may shower, but do not bathe.  Patient is advised to RETURN TO THE EMERGENCY DEPARTMENT for any of the following - worsening pain, fever/chills, nausea/vomiting, altered mental status, chest pain, shortness of breath, or any other new / worsening symptom.        SECONDARY DISCHARGE DIAGNOSES  Diagnosis: Umbilical hernia  Assessment and Plan of Treatment:

## 2023-04-19 NOTE — PROGRESS NOTE ADULT - SUBJECTIVE AND OBJECTIVE BOX
Subjective: Patient seen at bedside, complaint of nausea and has had episodes of emesis overnight, patient still refusing NGT and was educated on the importance of decompression to prevent complications such as aspiration. Patient NPO for OR later today       MEDICATIONS  (STANDING):  acetaminophen   IVPB .. 1000 milliGRAM(s) IV Intermittent every 6 hours  heparin   Injectable 5000 Unit(s) SubCutaneous every 8 hours  lactated ringers. 1000 milliLiter(s) (125 mL/Hr) IV Continuous <Continuous>    MEDICATIONS  (PRN):  ketorolac   Injectable 15 milliGRAM(s) IV Push every 6 hours PRN Moderate Pain (4 - 6)  morphine  - Injectable 2 milliGRAM(s) IV Push every 4 hours PRN Severe Pain (7 - 10)  ondansetron Injectable 4 milliGRAM(s) IV Push every 6 hours PRN Nausea      Vital Signs Last 24 Hrs  T(C): 36.8 (18 Apr 2023 06:09), Max: 37.1 (17 Apr 2023 09:26)  T(F): 98.3 (18 Apr 2023 06:09), Max: 98.8 (17 Apr 2023 09:26)  HR: 96 (18 Apr 2023 06:09) (80 - 101)  BP: 147/88 (18 Apr 2023 06:09) (104/71 - 147/88)  BP(mean): --  RR: 18 (18 Apr 2023 06:09) (18 - 20)  SpO2: 96% (18 Apr 2023 06:09) (86% - 96%)    Parameters below as of 18 Apr 2023 06:09  Patient On (Oxygen Delivery Method): nasal cannula        Physical Exam:    Constitutional: NAD  HEENT: PERRL, EOMI  Respiratory: Respirations non-labored, no accessory muscle use  Gastrointestinal: distended, tympanic,  non-tender, umbilical hernia not able to be reduced at bedside,   Neurological: A&O x 3      LABS:                        13.2   6.62  )-----------( 413      ( 18 Apr 2023 05:51 )             40.5     04-18    138  |  89<L>  |  50.8<H>  ----------------------------<  127<H>  3.9   |  32.0<H>  |  1.47<H>    Ca    9.8      18 Apr 2023 05:51  Phos  5.5     04-18  Mg     2.5     04-18    TPro  9.1<H>  /  Alb  4.8  /  TBili  0.5  /  DBili  x   /  AST  30  /  ALT  25  /  AlkPhos  54  04-17    PT/INR - ( 18 Apr 2023 05:51 )   PT: 12.8 sec;   INR: 1.10 ratio         PTT - ( 18 Apr 2023 05:51 )  PTT:25.9 sec      A: 52M with SBO secondary to incarcerated umbilical hernia with multiple episodes of vomiting after refusing NGT placement, bedside scope shows difficult nasal passage, plan for OR today for hernia repair    P:  NPO/IVF  Continued discussion on importance of NGT before surgery  Pain control  Anti-emetics  AM labs, trend Cr  JUAN JOSÉ  DVT PPx    
51 yo M with no significant PMH presenting with SBO secondary to incarcerated umbilical hernia without signs of bowel compromise.  Patient also noted to have MARIUM; IV fluids started.  Continue to monitor BMP.  After thorough chart review, no further work-up recommended.  Ultimately, decision to proceed with surgery is deferred to the anesthesiologist on the day of surgery.      Israel Cline DO  Attending Anesthesiologist
INTERVAL HPI/OVERNIGHT EVENTS:    Patient evaluated at bedside. No acute distress. No acute events overnight.  Patient is tolerating diet, having diet function, ambulating and voiding.     MEDICATIONS  (STANDING):  acetaminophen     Tablet .. 975 milliGRAM(s) Oral every 8 hours  heparin   Injectable 5000 Unit(s) SubCutaneous every 8 hours  methocarbamol 750 milliGRAM(s) Oral every 6 hours    MEDICATIONS  (PRN):  morphine  - Injectable 2 milliGRAM(s) IV Push every 4 hours PRN Severe Pain (7 - 10)  ondansetron    Tablet 4 milliGRAM(s) Oral every 6 hours PRN Nausea and/or Vomiting      Vital Signs Last 24 Hrs  T(C): 36.7 (19 Apr 2023 00:59), Max: 37 (18 Apr 2023 17:53)  T(F): 98.1 (19 Apr 2023 00:59), Max: 98.6 (18 Apr 2023 17:53)  HR: 75 (19 Apr 2023 00:59) (75 - 102)  BP: 144/80 (19 Apr 2023 00:59) (119/77 - 152/80)  BP(mean): 96 (18 Apr 2023 18:30) (91 - 96)  RR: 18 (19 Apr 2023 00:59) (13 - 18)  SpO2: 95% (19 Apr 2023 00:59) (92% - 100%)    Parameters below as of 19 Apr 2023 00:59  Patient On (Oxygen Delivery Method): room air        PHYSICAL EXAM:  GENERAL: NAD, well-groomed, well-developed  HEAD:  Atraumatic, Normocephalic  EYES: EOMI, PERRLA, conjunctiva and sclera clear  NECK: Supple, No JVD  CHEST/LUNG: non-labored breathing on room air.   HEART: Regular rate and rhythm; S1/S2  ABDOMEN: Soft, incisional tenderness, non-distended, no rebound or guarding. Incision with Dermabond clean and intact.   VASCULAR: Normal pulses, Normal capillary refill  EXTREMITIES:  2+ Peripheral Pulses, No cyanosis, No edema  SKIN: Warm, Intact       I&O's Detail    18 Apr 2023 07:01  -  19 Apr 2023 07:00  --------------------------------------------------------  IN:    Lactated Ringers: 75 mL    Oral Fluid: 637 mL  Total IN: 712 mL    OUT:  Total OUT: 0 mL    Total NET: 712 mL          LABS:                        10.9   6.62  )-----------( 292      ( 19 Apr 2023 06:53 )             34.9     04-19    139  |  99  |  24.6<H>  ----------------------------<  123<H>  4.7   |  30.0<H>  |  0.86    Ca    8.1<L>      19 Apr 2023 06:53  Phos  2.2     04-19  Mg     2.5     04-19    TPro  9.1<H>  /  Alb  4.8  /  TBili  0.5  /  DBili  x   /  AST  30  /  ALT  25  /  AlkPhos  54  04-17    PT/INR - ( 18 Apr 2023 05:51 )   PT: 12.8 sec;   INR: 1.10 ratio         PTT - ( 18 Apr 2023 05:51 )  PTT:25.9 sec      RADIOLOGY & ADDITIONAL STUDIES:

## 2023-04-19 NOTE — DISCHARGE NOTE NURSING/CASE MANAGEMENT/SOCIAL WORK - PATIENT PORTAL LINK FT
You can access the FollowMyHealth Patient Portal offered by [facility] by registering at the following website: [email or web address]. By joining [employer]’s FollowMyHealth portal, you will also be able to view your health information using other applications (apps) compatible with our system.

## 2023-04-19 NOTE — DIETITIAN INITIAL EVALUATION ADULT - ADD RECOMMEND
1) Encourage po intake, monitor diet tolerance, and provide assistance at meals as needed, encourage smaller meals   2) Obtain daily weights to monitor trends  3) Monitor nutrition related labs

## 2023-04-19 NOTE — DISCHARGE NOTE NURSING/CASE MANAGEMENT/SOCIAL WORK - NSDCPEFALRISK_GEN_ALL_CORE
For information on Fall & Injury Prevention, visit: [email or web address] OR  [email or web address] OR  https://www.cdc.gov/steadi/patient.html

## 2023-04-19 NOTE — DISCHARGE NOTE PROVIDER - NSDCMRMEDTOKEN_GEN_ALL_CORE_FT
acetaminophen 325 mg oral tablet: 3 tab(s) orally every 8 hours   acetaminophen 325 mg oral tablet: 3 tab(s) orally every 8 hours  ondansetron 4 mg oral tablet: 1 tab(s) orally every 6 hours as needed for Nausea and/or Vomiting MDD: 4

## 2023-04-19 NOTE — DIETITIAN INITIAL EVALUATION ADULT - ETIOLOGY
related to inability to meet sufficient protein-energy in setting of multiple comorbidities, poor appetite, N/V/D

## 2023-04-19 NOTE — DIETITIAN INITIAL EVALUATION ADULT - ORAL INTAKE PTA/DIET HISTORY
RD consult completed at bedside, RD bed scale weight obtained 340lbs. Pt states his weight prior to admission ~1 week ago was ~360lbs, weight obtained upon admission 330lbs. Pt reports weight loss is due to episodes of nausea/vomiting/diarrhea. Pt states appetite is poor due to nausea, recommended eating 3-4 smaller meals a day compared to 3 larger meals. Pt scheduled to discharge today. RD to remain available.

## 2023-04-24 PROBLEM — Z78.9 OTHER SPECIFIED HEALTH STATUS: Chronic | Status: ACTIVE | Noted: 2023-04-17

## 2023-04-28 PROBLEM — Z00.00 ENCOUNTER FOR PREVENTIVE HEALTH EXAMINATION: Status: ACTIVE | Noted: 2023-04-28

## 2023-05-02 ENCOUNTER — APPOINTMENT (OUTPATIENT)
Dept: TRAUMA SURGERY | Facility: CLINIC | Age: 53
End: 2023-05-02
Payer: MEDICARE

## 2023-05-02 VITALS
SYSTOLIC BLOOD PRESSURE: 157 MMHG | BODY MASS INDEX: 39.17 KG/M2 | DIASTOLIC BLOOD PRESSURE: 94 MMHG | RESPIRATION RATE: 16 BRPM | HEART RATE: 71 BPM | OXYGEN SATURATION: 96 % | TEMPERATURE: 97.9 F | HEIGHT: 75 IN | WEIGHT: 315 LBS

## 2023-05-02 PROCEDURE — 99024 POSTOP FOLLOW-UP VISIT: CPT

## 2023-05-04 NOTE — PHYSICAL EXAM
[Normal Breath Sounds] : Normal breath sounds [Normal Heart Sounds] : normal heart sounds [Abdomen Tenderness] : ~T ~M No abdominal tenderness [No Rash or Lesion] : No rash or lesion [Purpura] : no purpura  [Petechiae] : no petechiae [Skin Ulcer] : no ulcer [Skin Induration] : no induration [Alert] : alert [Oriented to Person] : oriented to person [Oriented to Place] : oriented to place [Oriented to Time] : oriented to time [Calm] : calm [de-identified] : wdwn  male  in  nad [de-identified] : Non icteric  sclera  PERRLA EOMS intact  and  nl [de-identified] : trachea  midline [de-identified] : soft  obese  abdomen   umbilical  surgical  site    slight moisture    no active  bleeding   no  odor   no  signs  of  cellulitis  no  erythematous  streaking  no  guarding  no  rebound

## 2023-05-04 NOTE — HISTORY OF PRESENT ILLNESS
[FreeTextEntry1] : General: \par General: \par - Primary Surgeon	Kal Kirk M.D \par - Assistant(s)	Luis Armenta M.D PGY-2 \par - Type of Anesthesia	General \par - Elective procedure	No \par \par Pre-Op Diagnosis, Post-Op Diagnosis and Procedure: \par Pre-Op, Post-Op and Procedure Selector: \par -  PRE-OP DIAGNOSIS: \par Umbilical hernia 18-Apr-2023 17:13:14  [place] Armenta. \par -  POST-OP DIAGNOSIS: \par Umbilical hernia 18-Apr-2023 17:13:23  [place] Armenta. \par \par \par Operative Findings: \par - Operative Findings	Procedure: Umbilical hernia repair with 6x6cm Parietex \par mesh. \par \par Preperitoneal fat in the hernia sac, reduced. \par Bowel seen and viable. \par 6x6 preperitoneal mesh placed and sutured in 4 corners with Prolene. \par Defect closed with simple 0 Prolene sutures\par \par Patient presents  to [facility]  clinic  for  post op exam  s/p  umbilical hernia  repair   Patient at  time  of  this exam  denies  any  abdominal pain  no  fevers  no  chills. nl bm nl urination \par \par

## 2023-05-04 NOTE — REVIEW OF SYSTEMS
[Negative] : Psychiatric [FreeTextEntry7] : s/p   umbilical hernia  repair [de-identified] : post op

## 2023-05-04 NOTE — ASSESSMENT
[FreeTextEntry1] : RTC 2 weeks \par Shower daily    make  sure incision site is  dry  \par Possible use of dry gauze to area to prevent moisture build up and skin friction \par No heavy lifting pulling  and  or  pushing\par Diet modification - need to offload pressure on  abdominal wall\par Discussion with patient   All questions answered  Any acute symptoms and or concerns patient understands  to call back office  and  or  go  directly to [facility] ED\par

## 2023-05-04 NOTE — VITALS
[Tender] : tender [Occasional] : occasional [FreeTextEntry3] : umbilical  site [FreeTextEntry1] : rest [FreeTextEntry2] : palpation

## 2023-05-11 ENCOUNTER — APPOINTMENT (OUTPATIENT)
Dept: TRAUMA SURGERY | Facility: CLINIC | Age: 53
End: 2023-05-11
Payer: MEDICARE

## 2023-05-11 VITALS
RESPIRATION RATE: 16 BRPM | WEIGHT: 315 LBS | BODY MASS INDEX: 39.17 KG/M2 | TEMPERATURE: 98 F | DIASTOLIC BLOOD PRESSURE: 83 MMHG | HEIGHT: 75 IN | SYSTOLIC BLOOD PRESSURE: 126 MMHG | HEART RATE: 65 BPM | OXYGEN SATURATION: 98 %

## 2023-05-11 PROCEDURE — 99024 POSTOP FOLLOW-UP VISIT: CPT

## 2023-05-12 NOTE — HISTORY OF PRESENT ILLNESS
[FreeTextEntry1] : General: \par General: \par - Primary Surgeon	Kal Kirk M.D \par - Assistant(s)	Luis Armenta M.D PGY-2 \par - Type of Anesthesia	General \par - Elective procedure	No \par \par Pre-Op Diagnosis, Post-Op Diagnosis and Procedure: \par Pre-Op, Post-Op and Procedure Selector: \par -  PRE-OP DIAGNOSIS: \par Umbilical hernia 18-Apr-2023 17:13:14  [place] Armenta. \par -  POST-OP DIAGNOSIS: \par Umbilical hernia 18-Apr-2023 17:13:23  [place] Armenta. \par \par \par Operative Findings: \par - Operative Findings	Procedure: Umbilical hernia repair with 6x6cm Parietex \par mesh. \par \par Preperitoneal fat in the hernia sac, reduced. \par Bowel seen and viable. \par 6x6 preperitoneal mesh placed and sutured in 4 corners with Prolene. \par Defect closed with simple 0 Prolene sutures\par \par Patient presents  to [facility]  clinic  for  post op exam  s/p  umbilical hernia  repair   Patient at  time  of  this exam  denies  any  abdominal pain  no  fevers  no  chills. nl bm nl urination Walking  daily\par \par

## 2023-05-12 NOTE — ASSESSMENT
[FreeTextEntry1] : RTC in 2weeks  for  wound  check\par No heavy  lifting pulling  and  or  pushing\par Diet modification - attempt to  loose  weight\par Discussion with patient   All questions answered  Any acute symptoms and or concerns patient understands  to call back office  and  or  go  directly to [facility] ED\par

## 2023-05-12 NOTE — PHYSICAL EXAM
[Normal Breath Sounds] : Normal breath sounds [Normal Heart Sounds] : normal heart sounds [Abdomen Tenderness] : ~T ~M No abdominal tenderness [No Rash or Lesion] : No rash or lesion [Purpura] : no purpura  [Petechiae] : no petechiae [Skin Ulcer] : no ulcer [Skin Induration] : no induration [Alert] : alert [Oriented to Person] : oriented to person [Oriented to Place] : oriented to place [Oriented to Time] : oriented to time [Calm] : calm [de-identified] : wdwn  male  in  nad [de-identified] : Non icteric  sclera  PERRLA EOMS intact  and  nl [de-identified] : trachea  midline [de-identified] : soft  obese  abdomen   umbilical  surgical  site    granulating well with visible scab    no active  bleeding   no  odor   no  signs  of  cellulitis  no  erythematous  streaking  no  guarding  no  rebound

## 2023-05-25 ENCOUNTER — APPOINTMENT (OUTPATIENT)
Dept: TRAUMA SURGERY | Facility: CLINIC | Age: 53
End: 2023-05-25
Payer: MEDICARE

## 2023-05-25 VITALS
DIASTOLIC BLOOD PRESSURE: 79 MMHG | TEMPERATURE: 97.6 F | SYSTOLIC BLOOD PRESSURE: 131 MMHG | RESPIRATION RATE: 16 BRPM | OXYGEN SATURATION: 97 % | HEART RATE: 71 BPM | HEIGHT: 75 IN | WEIGHT: 315 LBS | BODY MASS INDEX: 39.17 KG/M2

## 2023-05-25 DIAGNOSIS — K42.9 UMBILICAL HERNIA W/OUT OBSTRUCTION OR GANGRENE: ICD-10-CM

## 2023-05-25 PROCEDURE — 99024 POSTOP FOLLOW-UP VISIT: CPT

## 2023-05-26 PROBLEM — K42.9 UMBILICAL HERNIA: Status: ACTIVE | Noted: 2023-05-04

## 2023-05-26 NOTE — PHYSICAL EXAM
[Normal Breath Sounds] : Normal breath sounds [Normal Heart Sounds] : normal heart sounds [Abdomen Tenderness] : ~T ~M No abdominal tenderness [No Rash or Lesion] : No rash or lesion [Purpura] : no purpura  [Petechiae] : no petechiae [Skin Ulcer] : no ulcer [Skin Induration] : no induration [Alert] : alert [Oriented to Person] : oriented to person [Oriented to Place] : oriented to place [Oriented to Time] : oriented to time [Calm] : calm [de-identified] : wdwn  male  in  nad [de-identified] : Non icteric  sclera  PERRLA EOMS intact  and  nl [de-identified] : trachea  midline [de-identified] : soft  obese  abdomen   umbilical  surgical  site    granulating well with visible scab,dry,    no active  bleeding   no  odor   no  signs  of  cellulitis  no  erythematous  streaking  no  guarding  no  rebound

## 2023-05-26 NOTE — ASSESSMENT
[FreeTextEntry1] : RTC prn \par Avoid heavy lifting \par Diet modification - F/u PMD \par Discussion with patient   All questions answered  Any acute symptoms and or concerns patient understands  to call back office  and  or  go  directly to [facility] ED\par

## 2023-05-26 NOTE — HISTORY OF PRESENT ILLNESS
[FreeTextEntry1] : General: \par General: \par - Primary Surgeon	Kal Kirk M.D \par - Assistant(s)	Luis Armenta M.D PGY-2 \par - Type of Anesthesia	General \par - Elective procedure	No \par \par Pre-Op Diagnosis, Post-Op Diagnosis and Procedure: \par Pre-Op, Post-Op and Procedure Selector: \par -  PRE-OP DIAGNOSIS: \par Umbilical hernia 18-Apr-2023 17:13:14  [place]. \par -  POST-OP DIAGNOSIS: \par Umbilical hernia 18-Apr-2023 17:13:23  [place]. \par \par \par Operative Findings: \par - Operative Findings	Procedure: Umbilical hernia repair with 6x6cm Parietex \par mesh. \par \par Preperitoneal fat in the hernia sac, reduced. \par Bowel seen and viable. \par 6x6 preperitoneal mesh placed and sutured in 4 corners with Prolene. \par Defect closed with simple 0 Prolene sutures\par \par Patient presents  to [facility]  clinic  for  post op exam  s/p  umbilical hernia  repair   Patient at  time  of  this exam  denies  any  abdominal pain  no  fevers  no  chills. nl bm nl urination Walking  daily  Challenge to loose weight\par \par

## (undated) DEVICE — SUT VICRYL 3-0 27" SH UNDYED

## (undated) DEVICE — PACK MINOR WITH LAP

## (undated) DEVICE — GLV 7.5 PROTEXIS (WHITE)

## (undated) DEVICE — SPONGE PEANUT AUTO COUNT

## (undated) DEVICE — GOWN XL W TOWEL

## (undated) DEVICE — SUT PROLENE 0 30" CT-1

## (undated) DEVICE — DRAPE 1/2 SHEET 40X57"

## (undated) DEVICE — VENODYNE/SCD SLEEVE CALF MEDIUM

## (undated) DEVICE — WARMING BLANKET UPPER ADULT

## (undated) DEVICE — SUT VICRYL 2-0 27" CT-1 UNDYED

## (undated) DEVICE — DRAPE LAPAROTOMY PEDIATRIC

## (undated) DEVICE — STAPLER SKIN PROXIMATE

## (undated) DEVICE — DRSG OPSITE 2.5 X 2"

## (undated) DEVICE — BASIN SET DOUBLE